# Patient Record
Sex: FEMALE | Race: WHITE | NOT HISPANIC OR LATINO | URBAN - METROPOLITAN AREA
[De-identification: names, ages, dates, MRNs, and addresses within clinical notes are randomized per-mention and may not be internally consistent; named-entity substitution may affect disease eponyms.]

---

## 2018-04-05 ENCOUNTER — APPOINTMENT (RX ONLY)
Dept: URBAN - METROPOLITAN AREA CLINIC 288 | Facility: CLINIC | Age: 23
Setting detail: DERMATOLOGY
End: 2018-04-05

## 2018-04-05 DIAGNOSIS — D22 MELANOCYTIC NEVI: ICD-10-CM

## 2018-04-05 DIAGNOSIS — D485 NEOPLASM OF UNCERTAIN BEHAVIOR OF SKIN: ICD-10-CM

## 2018-04-05 DIAGNOSIS — L70.8 OTHER ACNE: ICD-10-CM

## 2018-04-05 PROBLEM — L70.0 ACNE VULGARIS: Status: ACTIVE | Noted: 2018-04-05

## 2018-04-05 PROBLEM — D48.5 NEOPLASM OF UNCERTAIN BEHAVIOR OF SKIN: Status: ACTIVE | Noted: 2018-04-05

## 2018-04-05 PROBLEM — L20.84 INTRINSIC (ALLERGIC) ECZEMA: Status: ACTIVE | Noted: 2018-04-05

## 2018-04-05 PROBLEM — D22.61 MELANOCYTIC NEVI OF RIGHT UPPER LIMB, INCLUDING SHOULDER: Status: ACTIVE | Noted: 2018-04-05

## 2018-04-05 PROBLEM — D22.72 MELANOCYTIC NEVI OF LEFT LOWER LIMB, INCLUDING HIP: Status: ACTIVE | Noted: 2018-04-05

## 2018-04-05 PROCEDURE — ? BIOPSY BY SHAVE METHOD

## 2018-04-05 PROCEDURE — ? TREATMENT REGIMEN

## 2018-04-05 PROCEDURE — ? PRESCRIPTION

## 2018-04-05 PROCEDURE — 99203 OFFICE O/P NEW LOW 30 MIN: CPT | Mod: 25

## 2018-04-05 PROCEDURE — ? PATIENT SPECIFIC COUNSELING

## 2018-04-05 PROCEDURE — ? COUNSELING

## 2018-04-05 PROCEDURE — 11100: CPT

## 2018-04-05 RX ORDER — TRETINOIN 0.25 MG/G
CREAM TOPICAL QHS
Qty: 1 | Refills: 2 | Status: ERX | COMMUNITY
Start: 2018-04-05

## 2018-04-05 RX ORDER — ADAPALENE AND BENZOYL PEROXIDE 3; 25 MG/G; MG/G
GEL TOPICAL
Qty: 1 | Refills: 4 | Status: ERX

## 2018-04-05 RX ORDER — DAPSONE 75 MG/G
GEL TOPICAL
Qty: 1 | Refills: 3 | Status: ERX | COMMUNITY
Start: 2018-04-05

## 2018-04-05 RX ADMIN — DAPSONE: 75 GEL TOPICAL at 14:45

## 2018-04-05 RX ADMIN — TRETINOIN: 0.25 CREAM TOPICAL at 14:45

## 2018-04-05 ASSESSMENT — LOCATION SIMPLE DESCRIPTION DERM
LOCATION SIMPLE: LEFT THIGH
LOCATION SIMPLE: LEFT UPPER BACK
LOCATION SIMPLE: CHIN
LOCATION SIMPLE: RIGHT SHOULDER

## 2018-04-05 ASSESSMENT — LOCATION ZONE DERM
LOCATION ZONE: FACE
LOCATION ZONE: LEG
LOCATION ZONE: ARM
LOCATION ZONE: TRUNK

## 2018-04-05 ASSESSMENT — LOCATION DETAILED DESCRIPTION DERM
LOCATION DETAILED: LEFT INFERIOR UPPER BACK
LOCATION DETAILED: LEFT CHIN
LOCATION DETAILED: LEFT ANTERIOR PROXIMAL THIGH
LOCATION DETAILED: RIGHT ANTERIOR SHOULDER

## 2018-04-05 ASSESSMENT — SEVERITY ASSESSMENT OVERALL AMONG ALL PATIENTS
IN YOUR EXPERIENCE, AMONG ALL PATIENTS YOU HAVE SEEN WITH THIS CONDITION, HOW SEVERE IS THIS PATIENT'S CONDITION?: MULTIPLE INFLAMMATORY LESIONS BUT NONINFLAMMATORY LESIONS PREDOMINATE

## 2018-04-05 NOTE — PROCEDURE: PATIENT SPECIFIC COUNSELING
Detail Level: Detailed
Pt has mirena IUD which she reports has caused acne. Pt declines any oral meds at this time after isabella was mentioned as a possible medication to try. Pauly mentioned oral meds gently as an option and only once. We agreed to try Elta clear in addition to her current topicals which are a good combination. Pt said she would try the Elta and get back to us if acne persists. I said there were some other topicals we could try.

## 2018-04-05 NOTE — PROCEDURE: TREATMENT REGIMEN
Initiate Treatment: Spironolactone 25 mg one po bid for 2 weeks then 2 po bid- patient declines at this time due to notwant to take any oral pills at this time due to patient is bad at taking pills.
Detail Level: Zone
Plan: [facility]
Continue Regimen: Epiduo gel forte to spot treat\\nAczone gel apply to face in the am time\\nTretinoin 0.025% cream to face at night.

## 2018-04-05 NOTE — PROCEDURE: COUNSELING
Patient Specific Counseling (Will Not Stick From Patient To Patient): Patient has mirena currently
Detail Level: Zone

## 2018-04-05 NOTE — PROCEDURE: BIOPSY BY SHAVE METHOD
Type Of Destruction Used: Curettage
Billing Type: Third-Party Bill
Hemostasis: Aluminum Chloride
Biopsy Type: H and E
Size Of Lesion In Cm: 0
Dressing: bandage
Anesthesia Type: 2% lidocaine with epinephrine
Consent: Written consent was obtained and risks were reviewed including but not limited to scarring, infection, bleeding, scabbing, incomplete removal, nerve damage and allergy to anesthesia.
Wound Care: Bacitracin
Bill For Surgical Tray: no
Anesthesia Volume In Cc (Will Not Render If 0): 0.5
Notification Instructions: Patient will be notified of biopsy results. However, patient instructed to call the office if not contacted within 2 weeks.
Electrodesiccation And Curettage Text: The wound bed was treated with electrodesiccation and curettage after the biopsy was performed.
Lab: [id number]
Cryotherapy Text: The wound bed was treated with cryotherapy after the biopsy was performed.
Silver Nitrate Text: The wound bed was treated with silver nitrate after the biopsy was performed.
Lab Facility: [id number]
Detail Level: Detailed
Post-Care Instructions: I reviewed with the patient in detail post-care instructions. Patient is to keep the biopsy site dry overnight, and then apply bacitracin twice daily until healed. Patient may apply hydrogen peroxide soaks to remove any crusting.
Electrodesiccation Text: The wound bed was treated with electrodesiccation after the biopsy was performed.
Curettage Text: The wound bed was treated with curettage after the biopsy was performed.
Render Post-Care Instructions In Note?: yes
Biopsy Method: 15 blade

## 2018-06-13 ENCOUNTER — RX ONLY (OUTPATIENT)
Age: 23
Setting detail: RX ONLY
End: 2018-06-13

## 2018-06-13 RX ORDER — SPIRONOLACTONE 25 MG/1
TAKE ONE TABLET PO BID X 2 WEEKS THEN TABLET, FILM COATED ORAL
Qty: 120 | Refills: 2 | Status: ERX | COMMUNITY
Start: 2018-06-13
